# Patient Record
Sex: MALE | Race: BLACK OR AFRICAN AMERICAN | NOT HISPANIC OR LATINO | URBAN - NONMETROPOLITAN AREA
[De-identification: names, ages, dates, MRNs, and addresses within clinical notes are randomized per-mention and may not be internally consistent; named-entity substitution may affect disease eponyms.]

---

## 2021-03-31 ENCOUNTER — IMPORTED ENCOUNTER (OUTPATIENT)
Dept: URBAN - NONMETROPOLITAN AREA CLINIC 1 | Facility: CLINIC | Age: 71
End: 2021-03-31

## 2021-03-31 PROBLEM — H52.4: Noted: 2021-03-31

## 2021-03-31 PROBLEM — H25.13: Noted: 2021-03-31

## 2021-03-31 PROCEDURE — 92015 DETERMINE REFRACTIVE STATE: CPT

## 2021-03-31 PROCEDURE — 92004 COMPRE OPH EXAM NEW PT 1/>: CPT

## 2021-03-31 NOTE — PATIENT DISCUSSION
[place] OUDiscussed refractive status in detail with patient. New glasses Rx given today. (not rec to fill)Continue to monitor. [place] OUDiscussed diagnosis with patient. Reviewed symptoms related to cataract progression. Discussed various treatment options with patient. Recommend cataract evaluation by Dr. Jeri Sweet. Patient elects to schedule cat evalContinue to monitor.

## 2022-04-15 ASSESSMENT — TONOMETRY
OS_IOP_MMHG: 17
OD_IOP_MMHG: 17

## 2022-04-15 ASSESSMENT — VISUAL ACUITY
OD_CC: 20/60
OS_CC: 20/50